# Patient Record
Sex: MALE | Race: WHITE | URBAN - METROPOLITAN AREA
[De-identification: names, ages, dates, MRNs, and addresses within clinical notes are randomized per-mention and may not be internally consistent; named-entity substitution may affect disease eponyms.]

---

## 2019-12-31 ENCOUNTER — EMERGENCY (EMERGENCY)
Facility: HOSPITAL | Age: 69
LOS: 0 days | Discharge: HOME | End: 2019-12-31
Attending: EMERGENCY MEDICINE
Payer: MEDICARE

## 2019-12-31 VITALS — HEIGHT: 72 IN | WEIGHT: 169.98 LBS

## 2019-12-31 VITALS
OXYGEN SATURATION: 99 % | RESPIRATION RATE: 15 BRPM | TEMPERATURE: 97 F | SYSTOLIC BLOOD PRESSURE: 158 MMHG | HEART RATE: 63 BPM | DIASTOLIC BLOOD PRESSURE: 92 MMHG

## 2019-12-31 DIAGNOSIS — T36.8X5A ADVERSE EFFECT OF OTHER SYSTEMIC ANTIBIOTICS, INITIAL ENCOUNTER: ICD-10-CM

## 2019-12-31 DIAGNOSIS — R25.1 TREMOR, UNSPECIFIED: ICD-10-CM
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
## 2019-12-31 DIAGNOSIS — I10 ESSENTIAL (PRIMARY) HYPERTENSION: ICD-10-CM

## 2019-12-31 PROCEDURE — 99282 EMERGENCY DEPT VISIT SF MDM: CPT

## 2019-12-31 NOTE — ED STATDOCS - NS ED ROS FT
Constitutional: No fever. +chills/rigors.   Eyes: No visual changes  HEENT: No throat pain  CV: No chest pain  Resp: No SOB no cough  GI: No abd pain, nausea or vomiting  : No dysuria  MSK: No musculoskeletal pain  Skin: No rash  Neuro: No headache. +tremors/dizziness.

## 2019-12-31 NOTE — ED STATDOCS - PHYSICAL EXAMINATION
Constitutional: NAD AAOx3  Eyes: PERRLA EOMI  Head: Normocephalic atraumatic  Mouth: MMM. no tongue or uvular swelling.   Cardiac: regular rate   Resp: Lungs CTAB. no wheezing.   GI: Abd s/nt/nd  Neuro: CN2-12 intact  Skin: No rashes

## 2019-12-31 NOTE — ED STATDOCS - OBJECTIVE STATEMENT
68 y/o male with PMHx of HTN presents to the ED for generalized rigors/chills after taking medications today. Sx now resolved. +mild dizziness. Pt took 500mg Levaquin called in by urologist Dr. Goldberg. 15 minutes after taking medication, pt noted onset of generalized tremors which he noted to be a side effect of medication, after which he drank copious fluids and threw up medication. Denies fever, tongue swelling, throat swelling, itch, difficulty breathing. Nonsmoker. No EtOH. 68 y/o male with PMHx of HTN presents to the ED for generalized rigors/chills after taking medications today. Sx now resolved. +mild dizziness. Pt took 500mg Levaquin called in by urologist Dr. Goldberg. 15 minutes after taking medication, pt noted onset of generalized tremors which he noted to be a side effect of medication, after which he drank copious fluids and forced himself to throw up medication. Denies fever, tongue swelling, throat swelling, itch, difficulty breathing. Nonsmoker. No EtOH.

## 2019-12-31 NOTE — ED STATDOCS - NSFOLLOWUPINSTRUCTIONS_ED_ALL_ED_FT
General Allergic Reaction    WHAT YOU NEED TO KNOW:    An allergic reaction is your body's response to an allergen. Allergens include medicines, food, insect stings, animal dander, mold, latex, chemicals, and dust mites. Pollen from trees, grass, and weeds can also cause an allergic reaction. An allergic reaction can range from mild to severe.    DISCHARGE INSTRUCTIONS:    Call 911 for signs or symptoms of anaphylaxis, such as trouble breathing, swelling in your mouth or throat, or wheezing. You may also have itching, a rash, hives, or feel like you are going to faint.    Return to the emergency department if:     You have a skin rash, hives, swelling, or itching that is starting to get worse.      Your throat tightens, or your lips or tongue swell.      You have trouble swallowing or speaking.      You have worsening nausea, diarrhea, or abdominal cramps, or you are vomiting.      You have chest pain or tightness.    Contact your healthcare provider if:     You have questions or concerns about your condition or care.        Medicines: You may need any of the following:     Medicines may be given to relieve certain allergy symptoms such as itching, sneezing, and swelling. You may take them as a pill or use drops in your nose or eyes. Topical treatments may be given to put directly on your skin to help decrease itching or swelling.      Epinephrine may be prescribed if you are at risk for anaphylaxis. This is a severe allergic reaction that can be life-threatening. Your healthcare provider will tell you if you need to keep epinephrine with you. You will be taught when and how to use it.      Take your medicine as directed. Contact your healthcare provider if you think your medicine is not helping or if you have side effects. Tell him of her if you are allergic to any medicine. Keep a list of the medicines, vitamins, and herbs you take. Include the amounts, and when and why you take them. Bring the list or the pill bottles to follow-up visits. Carry your medicine list with you in case of an emergency.    Follow up with your healthcare provider as directed: Write down your questions so you remember to ask them during your visits.     Manage your symptoms:     Avoid allergens. You may need to have allergy testing with your healthcare provider or a specialist to find your allergens.      Use cold compresses on your skin or eyes. This will help soothe skin or eyes affected by the allergic reaction. You can make a cold compress by soaking a washcloth in cool water. Wring out the extra water before you apply the washcloth.      Rinse your nasal passages with a saline solution. Daily rinsing may help clear allergens out of your nose. Use distilled water if possible. You can also boil tap water and then let it cool before you use it. Do not use tap water without boiling it first.      Do not smoke. Nicotine and other chemicals in cigarettes and cigars can make an allergic reaction worse, and can also cause lung damage. Ask your healthcare provider for information if you currently smoke and need help to quit. E-cigarettes or smokeless tobacco still contain nicotine. Talk to your healthcare provider before you use these products.     CALL DR GOLDBERG TODAY FOR NEW ANTIBIOTIC. STOP TAKING LEVAQUIN. RETURN TO ER FOR ANY WORSENING SYMPTOMS OR NEW CONCERNS.

## 2019-12-31 NOTE — ED ADULT NURSE NOTE - OBJECTIVE STATEMENT
Pt to ED for generalized rigors/chills after taking medications today. Sx now resolved. +mild dizziness. Pt took 500mg Levaquin called in by urologist Dr. Goldberg. 15 minutes after taking medication, pt noted onset of generalized tremors which he noted to be a side effect of medication, after which he drank copious fluids and threw up medication.

## 2019-12-31 NOTE — ED STATDOCS - NS_ ATTENDINGSCRIBEDETAILS _ED_A_ED_FT
I, Chapito Sharma MD,  performed the initial face to face bedside interview with this patient regarding history of present illness, review of symptoms and relevant past medical, social and family history.  I completed an independent physical examination.  I was the initial provider who evaluated this patient.  The history, relevant review of systems, past medical and surgical history, medical decision making, and physical examination was documented by the scribe in my presence and I attest to the accuracy of the documentation.

## 2019-12-31 NOTE — ED STATDOCS - CLINICAL SUMMARY MEDICAL DECISION MAKING FREE TEXT BOX
69 M presents to ED for shaking after taking Levaquin for first time. States that he read the fine print and it said that this is an adverse reaction and it made his sx worse. sx spontaneously resolved and pt feels at baseline. here exam ins nonfocal. no signs of allergic reaction or tremor. will  pt to stop taking Levaquin and call urologist to switch abx. will dc to follow up. 69 M presents to ED for shaking after taking Levaquin for first time. States that he read the fine print and it said that this is an adverse reaction and it made his sx worse. sx spontaneously resolved and pt feels at baseline. here exam is nonfocal. no signs of allergic reaction or tremor or sepsis. will  pt to stop taking Levaquin and call urologist to switch abx. will dc to follow up.

## 2019-12-31 NOTE — ED STATDOCS - PATIENT PORTAL LINK FT
You can access the FollowMyHealth Patient Portal offered by [facility] by registering at the following website: [email or web address]. By joining [facility]’s FollowMyHealth portal, you will also be able to view your health information using other applications (apps) compatible with our system.

## 2019-12-31 NOTE — ED STATDOCS - PROGRESS NOTE DETAILS
signed Elaina Alfaro PA-C Pt seen initially in intake by Dr Chapito Sharma.   69M c/o shaking/tremors 15 mins after taking levaquin today for the first time as prescribed by his urologist Gary Goldberg for prostatitis. Pt drank copious fluids and made himself throw up right away. Pt currently asymptomatic. Pt alert, NAD, afebrile, well appearing. Advised pt may have been a side effect of medication and to not take again. Pt to call Dr Goldberg for different abx according to his cx results. Pt feeling well at DC, agrees with DC and plan of care.

## 2019-12-31 NOTE — ED ADULT TRIAGE NOTE - CHIEF COMPLAINT QUOTE
Patient comes in approx. 30 mins after taking levofloxacin 500mg with uncontrollable shaking. Patient states he made himself vomit after 15 mins when the symptoms started. Patient denies sob/rash. No signs of acute distress noted.

## 2019-12-31 NOTE — ED ADULT NURSE NOTE - NSIMPLEMENTINTERV_GEN_ALL_ED
Implemented All Universal Safety Interventions:  Edson to call system. Call bell, personal items and telephone within reach. Instruct patient to call for assistance. Room bathroom lighting operational. Non-slip footwear when patient is off stretcher. Physically safe environment: no spills, clutter or unnecessary equipment. Stretcher in lowest position, wheels locked, appropriate side rails in place.

## 2024-01-15 ENCOUNTER — NON-APPOINTMENT (OUTPATIENT)
Age: 74
End: 2024-01-15

## 2025-05-02 NOTE — ED ADULT NURSE NOTE - NS ED NOTE ABUSE SUSPICION NEGLECT YN
Cardiology Discharge Summary      Patient Care Team:  Rakel Lan MD as PCP - General  Cosmo Her MD as Consulting Physician (Nephrology)    Date of Admission: 5/1/2025    Date of Discharge:  5/2/2025    Length of stay:  LOS: 0 days     ADMISSION DIAGNOSIS:    CAD (coronary artery disease)        DISCHARGE DIAGNOSIS:    CAD (coronary artery disease)  Status post PCI/LAD-staged procedure      CHF Guideline Directed Medical Therapy  Beta Blocker:   ARNI/ACE/ARB:   SGLT 2 inhibitors:   Diuretics:   Aldosterone Antagonist:   Vasodilators & Nitrates:     Presenting Problem/History of Present Illness    Active Hospital Problems    Diagnosis  POA    **CAD (coronary artery disease) [I25.10]  Unknown      Resolved Hospital Problems   No resolved problems to display.          HOSPITAL COURSE:  Patient is a 65 y.o. male who presented to [facility] 5/1/2025 for elective staged heart cath with PCI/LAD.  Please see full procedure note for specific details.  The patient experienced a vagal response to femoral sheath removal with drop in heart rate and blood pressure.  He was given 0.5 mg atropine with good response.  He had no additional events.  His groin site is without hematoma or bruising.  The patient did have questions that were answered to his satisfaction.  Diastolic blood pressure has been elevated, therefore he was transition back to his prior dose of losartan for better blood pressure management.  He is stable for discharge home today.    Past Medical History:     Past Medical History:   Diagnosis Date    BPH (benign prostatic hyperplasia)     Dysplasia of one kidney     GERD (gastroesophageal reflux disease)     Hyperlipidemia     Hypertension     IBS (irritable bowel syndrome)     Osteopenia     Sleep apnea     Vitamin D deficiency        Past Surgical History:     Past Surgical History:   Procedure Laterality Date    APPENDECTOMY      CARDIAC CATHETERIZATION N/A 4/21/2025    Procedure:  "Left Heart Cath;  Surgeon: Elio Centeno DO;  Location: [facility] CATH INVASIVE LOCATION;  Service: Cardiovascular;  Laterality: N/A;    CARDIAC CATHETERIZATION N/A 5/1/2025    Procedure: Percutaneous Coronary Intervention;  Surgeon: Elio Centeno DO;  Location: [facility] CATH INVASIVE LOCATION;  Service: Cardiovascular;  Laterality: N/A;  LAD    CARDIAC CATHETERIZATION N/A 5/1/2025    Procedure: Stent DANIEL coronary;  Surgeon: Elio Centeno DO;  Location: [facility] CATH INVASIVE LOCATION;  Service: Cardiovascular;  Laterality: N/A;  LAD    THYROID SURGERY         Social History:   Social History     Socioeconomic History    Marital status:    Tobacco Use    Smoking status: Never     Passive exposure: Never    Smokeless tobacco: Never   Vaping Use    Vaping status: Never Used   Substance and Sexual Activity    Alcohol use: Yes     Alcohol/week: 2.0 standard drinks of alcohol     Types: 2 Cans of beer per week     Comment: occassionally    Drug use: Never    Sexual activity: Defer       Procedures Performed     Left heart catheterization  Percutaneous coronary intervention  Consults:   Consulting Physician(s)                     None                Pertinent Test Results:     CBC    Results from last 7 days   Lab Units 05/02/25  0050 05/01/25  1300   WBC 10*3/mm3 7.39 5.25   HEMOGLOBIN g/dL 15.0 15.2   PLATELETS 10*3/mm3 198 213     Cr Clearance Estimated Creatinine Clearance: 64.3 mL/min (A) (by C-G formula based on SCr of 1.36 mg/dL (H)).  Coag     HbA1C   Lab Results   Component Value Date    HGBA1C 5.70 (H) 10/20/2023    HGBA1C 5.9 (H) 08/28/2021     Blood Glucose No results found for: \"POCGLU\"  Infection     CMP   Results from last 7 days   Lab Units 05/02/25  0050 05/01/25  1300   SODIUM mmol/L 138 141   POTASSIUM mmol/L 3.8 4.1   CHLORIDE mmol/L 104 105   CO2 mmol/L 22.1 23.9   BUN mg/dL 20 22   CREATININE mg/dL 1.36* 1.33*   GLUCOSE mg/dL 161* 94     ABG      UA    " "  MELLISA  No results found for: \"POCMETH\", \"POCAMPHET\", \"POCBARBITUR\", \"POCBENZO\", \"POCCOCAINE\", \"POCOPIATES\", \"POCOXYCODO\", \"POCPHENCYC\", \"POCPROPOXY\", \"POCTHC\", \"POCTRICYC\"  Lysis [facility]   Results from last 7 days   Lab Units 05/02/25  0050 05/01/25  1300   HEMOGLOBIN g/dL 15.0 15.2   PLATELETS 10*3/mm3 198 213   CREATININE mg/dL 1.36* 1.33*     Radiology(recent) No radiology results for the last day             Condition on Discharge: Stable    Vital Signs  Visit Vitals  /86   Pulse 70   Temp 97.8 °F (36.6 °C) (Oral)   Resp 15   Ht 182.9 cm (72\")   Wt 86.2 kg (190 lb 0.6 oz)   SpO2 95%   BMI 25.77 kg/m²         PHYSICAL EXAM:    General: Alert, cooperative, no distress, appears stated age  Head:  Normocephalic, atraumatic, mucous membranes moist  Eyes:  Conjunctivae/corneas clear, EOM's intact     Neck:  Supple,  no adenopathy; no JVD or bruit  Lungs:  Clear to auscultation bilaterally, no wheezes, rhonchi or rales are noted  Chest wall: No tenderness  Heart::  Regular rate and rhythm, S1 and S2 normal, no murmur, rub or gallop.  Groin site without hematoma or bruising  Abdomen: Soft, nontender, nondistended, bowel sounds active  Extremities: No cyanosis, clubbing, or edema   Pulses: 2+ and symmetric all extremities  Skin:  No rashes or lesions  Neuro/psych: A&O x3. CN II through XII are grossly intact with appropriate affect         Discharge Disposition: Home  Home or Self Care    Discharge Medications     Discharge Medications        Changes to Medications        Instructions Start Date   losartan 100 MG tablet  Commonly known as: COZAAR  What changed:   medication strength  how much to take   100 mg, Daily   Start Date: May 3, 2025            Continue These Medications        Instructions Start Date   allopurinol 300 MG tablet  Commonly known as: ZYLOPRIM   TAKE 1 TABLET BY MOUTH EVERY DAY      Aspirin Low Dose 81 MG EC tablet  Generic drug: aspirin   81 mg, Oral, Daily      atorvastatin 40 MG " "tablet  Commonly known as: LIPITOR   40 mg, Oral, Nightly      Brilinta 90 MG tablet tablet  Generic drug: ticagrelor   90 mg, Oral, 2 Times Daily      carvedilol 3.125 MG tablet  Commonly known as: COREG   3.125 mg, Oral, 2 Times Daily With Meals      DULoxetine 60 MG capsule  Commonly known as: CYMBALTA   TAKE 1 CAPSULE BY MOUTH EVERY DAY      famotidine 20 MG tablet  Commonly known as: PEPCID   40 mg, Oral, Daily      finasteride 5 MG tablet  Commonly known as: Proscar   TAKE 1 TABLET BY MOUTH EVERY DAY      levothyroxine 50 MCG tablet  Commonly known as: SYNTHROID, LEVOTHROID   50 mcg, Oral, Daily      nitroglycerin 0.4 MG SL tablet  Commonly known as: NITROSTAT   0.4 mg, Sublingual, Every 5 Minutes PRN, Take no more than 3 doses in 15 minutes.               Discharge Diet:  Heart healthy    Activity at Discharge:   Routine post heart cath discharge instructions    Follow-up Appointments  Future Appointments   Date Time Provider Department Center   5/8/2025  8:00 AM Eliza Baker APRN [facility]         Test Results Pending at Discharge       Risk for Readmission (LACE) Score: 3 (5/2/2025  6:00 AM)      Time: Discharge 15 min    BRIAN Thacker  05/02/25  13:03 EDT      EMR Dragon/Transcription:   \"Dictated utilizing Dragon dictation\".     Electronically signed by BRIAN Thacker, 05/02/25, 12:55 PM EDT.  Copied text in this note has been reviewed by me and is accurate as of 05/02/25.      Cardiology attending:  Seen and examined.  Chart and labs reviewed. Independent interpretations of cardiac testing was performed. History and exam findings are verified with above changes noted.  Assessment and plan notated by APC after being formulated by attending consultant.  Note that greater than 50% of the time spent in care of the patient was provided by attending consultant.    Patient denies any chest pain pressure heaviness or tightness.  Reports feeling well.  Reviewed cath films with " patient and family.  No new issues.  Groin site soft mildly tender without hematoma.  Cardiology status appropriate for discharge.  Discussed the importance of antiplatelet therapy.  Discussed cardiac rehab and will arrange for stress testing and phase 2 rehab.  Blood pressure is a little lower today.  Will cut back on losartan dose and continue to monitor blood pressure.    Physical Exam:    General: Alert, cooperative, no distress, appears stated age  Head:  Normocephalic, atraumatic, mucous membranes moist  Eyes:  Conjunctivae/corneas clear, EOM's intact     Neck:  Supple,  no bruit     Lungs:            Clear to auscultation bilaterally, no wheezes rhonchi rales are noted  Chest wall: No tenderness  Heart::  Regular rate and rhythm, S1 and S2 normal, no murmur, rub or gallop  Abdomen: Soft, nontender, nondistended bowel sounds active.  No hematoma.  Mild tenderness.  Extremities: No cyanosis, clubbing, or edema  Pulses:            2+ and symmetric all extremities  Skin:  No rashes or lesions  Neuro/psych: A&O x3. CN II through XII are grossly intact with appropriate affect   No